# Patient Record
Sex: FEMALE | HISPANIC OR LATINO | Employment: UNEMPLOYED | URBAN - METROPOLITAN AREA
[De-identification: names, ages, dates, MRNs, and addresses within clinical notes are randomized per-mention and may not be internally consistent; named-entity substitution may affect disease eponyms.]

---

## 2017-10-16 ENCOUNTER — HOSPITAL ENCOUNTER (OUTPATIENT)
Dept: RADIOLOGY | Facility: MEDICAL CENTER | Age: 22
End: 2017-10-16
Attending: PSYCHIATRY & NEUROLOGY
Payer: MEDICAID

## 2017-10-16 VITALS
DIASTOLIC BLOOD PRESSURE: 81 MMHG | TEMPERATURE: 98.3 F | HEART RATE: 118 BPM | BODY MASS INDEX: 16.22 KG/M2 | WEIGHT: 107 LBS | OXYGEN SATURATION: 93 % | HEIGHT: 68 IN | SYSTOLIC BLOOD PRESSURE: 130 MMHG | RESPIRATION RATE: 16 BRPM

## 2017-10-16 DIAGNOSIS — G93.5 COMPRESSION OF BRAIN (HCC): ICD-10-CM

## 2017-10-16 PROCEDURE — 700111 HCHG RX REV CODE 636 W/ 250 OVERRIDE (IP)

## 2017-10-16 PROCEDURE — 72141 MRI NECK SPINE W/O DYE: CPT

## 2017-10-16 PROCEDURE — 70551 MRI BRAIN STEM W/O DYE: CPT

## 2017-10-16 PROCEDURE — 72146 MRI CHEST SPINE W/O DYE: CPT

## 2017-10-16 PROCEDURE — 700101 HCHG RX REV CODE 250

## 2017-10-16 ASSESSMENT — PAIN SCALES - WONG BAKER
WONGBAKER_NUMERICALRESPONSE: DOESN'T HURT AT ALL

## 2017-10-16 NOTE — DISCHARGE INSTRUCTIONS
MRI DISCHARGE  ADULT DISCHARGE INSTRUCTIONS    __________________________________________________________________________        YOU HAVE BEEN MEDICATED TODAY FOR YOUR SCN. PLEASE FOLLOW THE INSTRUCTIONS BELOW TO ENSURE YOUR SAFE RECOVERY. IF YOU HAVE ANY QUESTIONS OR PROBLEMS FEEL FREE TO CALL US [phone].    1) HAVE SOMEONE STAY WITH YOU TO ASSIST YOU AS NEEDED.    2) DO NOT DRIVE OR OPERATE ANY MECHANICAL DEVICES.    3) DO NOT PERFORM ANY ACTIVITY THAT REQUIRES CONCENTRATION, MAKE NO MAJOR DECISIONS OVER THE NEXT 24 HOURS.    4) BE CAREFUL CHANGING POSITIONS FROM LAYING DOWN TO SITTING OR STANDING AS YOU MAY BECOME DIZZY.     5) DO NOT DRINK ALCOHOL FOR 48 HOURS.     6) THERE ARE NO RESTRICTIONS FOR EATING YOUR NORMAL MEALS. DRINK FLUIDS.    7) YOU MAY CONTINUE YOUR USUAL MEDICATIONS FOR PAIN, TRANQUILIZERS,  MUSCLE RELAXANTS OR SEDATIVES AFTER 12 HOURS.     8) TOMORROW, YOU MAY CONTINUE YOUR NORMAL DAILY ACTIVITIES.     9) LEAVE PRESSURE DRESSING ON 10 - 15 MINUTES. IF SWELLING OR BLEEDING OCCURS WHEN REMOVED, CONTINUE PLACING DIRECT PRESSURE ON SITE FOR  ANOTHER 5 MINUTES OR UNTIL BLEEDING STOPS.     I HAVE BEEN INFORMED OF AND UNDERSTAND THE ABOVE DISCHARGE INSTRUCTIONS.       __________________________________                               PATIENT/FAMILY MEMBER SIGNATURE      __________________________________                                                RELATIONSHIP TO PATIENT    10/16/2017                        10:24 AM      __________________________________  NURSE OR WITNESS SIGNATURE

## 2017-11-13 ENCOUNTER — HOSPITAL ENCOUNTER (OUTPATIENT)
Dept: INFUSION CENTER | Facility: MEDICAL CENTER | Age: 22
End: 2017-11-13
Attending: NEUROLOGICAL SURGERY
Payer: MEDICAID

## 2017-11-13 VITALS — OXYGEN SATURATION: 100 % | HEART RATE: 100 BPM | TEMPERATURE: 98.6 F | RESPIRATION RATE: 18 BRPM

## 2017-11-13 PROCEDURE — 999999 HB NO CHARGE

## 2017-11-13 PROCEDURE — 99212 OFFICE O/P EST SF 10 MIN: CPT

## 2017-11-14 NOTE — PROGRESS NOTES
Pt to [facility] for neurosurgery visit, accompanied by mother.  Pt awake and alert, afebrile, VSS.  Visit completed with Dr. Burnett.  Will schedule follow-up in months with referrals to ENT and for swallow evaluation.  Family to get x-rays before next appointment Pt home with mother.        Level of Care/Points                 Assessment   Pts      Focused nursing assessment    Full nursing assessment   5 Vital signs - calculate every time perfomed           Special Needs   15 Pediatric/Minor Patient Management    Hear/Language/Visual special needs    Additional assistance/Altered mentation/physical limitations    Play Therapy/Diversion Activity    Isolation Management         Focused Assessment    Pain assessment    Neuro assessment    Potential abuse assessment          Coordination of Care   5 Simple Patient/Family/Staff Education for ongoing care    Complex Patient/Family/Staff Education for ongoing care   5 Staff retrieves consents, Records, test results, processes orders    Staff Telephones Physician office to clarify orders    Coordination of consults   10 Simple Discharge Coordination    Complex (extensive) Discharge Coordination         Interventions    PO meds 1-3 calculate additional 5 points for 4-6 meds and apply as many times as needed    Sublingual Meds (1-3)    Sublingual Meds (4-6)    Suppositories calculate for each time given    Topical Meds (1-3), these medications include topical lidocaine, ointment, ect    Topical Meds (4-6), these medications include topical lidocaine, ointment, ect       Eye Drops - eye drops should be calculated per time given.  Multiple drops per eye should not be counted seperately    Medication Titration calculation once    Oxygen Cannula only if placed by staff    Oxygen Mask only if placed by staff         Central Venous Access Device    Sterile dressing change    PICC arm circumference and external catheter    Central Venous Catheter Removal          Miscellaneous    Difficult Specimen collection 0-3 years old (cultures, biopsies, blood, bodily fluids, etc    Patient Transfer (multiple staff/Lift equipment    Replace Tracheostomy Tube    Tracheostomy care and dressing change    Tracheostomy suctioning    Medication Reaction    Blood Product Reaction       Point Assessment     New/Established Patient - Level 1 (15-20 points)    x New/Established Patient - Level 2 (21-45 points)     New/Established Patient - Level 3 (46-70 points)     New/Established Patient - Level 4 ( points)     New/Established Patient - Level 5 (106 or more)

## 2018-03-19 ENCOUNTER — HOSPITAL ENCOUNTER (OUTPATIENT)
Dept: RADIOLOGY | Facility: MEDICAL CENTER | Age: 23
End: 2018-03-19
Attending: NEUROLOGICAL SURGERY
Payer: MEDICAID

## 2018-03-19 ENCOUNTER — HOSPITAL ENCOUNTER (OUTPATIENT)
Dept: INFUSION CENTER | Facility: MEDICAL CENTER | Age: 23
End: 2018-03-19
Attending: NEUROLOGICAL SURGERY
Payer: MEDICAID

## 2018-03-19 VITALS — HEART RATE: 98 BPM | RESPIRATION RATE: 18 BRPM | TEMPERATURE: 98.6 F | OXYGEN SATURATION: 99 %

## 2018-03-19 DIAGNOSIS — G93.5 COMPRESSION OF BRAIN (HCC): ICD-10-CM

## 2018-03-19 PROCEDURE — 72040 X-RAY EXAM NECK SPINE 2-3 VW: CPT

## 2018-03-19 PROCEDURE — 99212 OFFICE O/P EST SF 10 MIN: CPT

## 2018-03-19 NOTE — PROGRESS NOTES
The Medication Reconciliation process has been completed by interviewing the patient's family.    Allergies have been reviewed  Antibiotic use in 30 days - none    Home Pharmacy:  [facility]

## 2018-03-19 NOTE — PROGRESS NOTES
Pt to [facility] for neurosurgery visit, accompanied by mother.  Pt awake and alert, afebrile, VSS.  Visit completed with Dr. Burnett.  Will schedule follow-up in 6 months with MRI Quick Scan - Cervical Spine.  Will also get notes from [facility]'s consultation.   Pt home with mother.        Level of Care/Points                 Assessment   Pts      Focused nursing assessment    Full nursing assessment   5 Vital signs - calculate every time perfomed           Special Needs   15 Pediatric/Minor Patient Management    Hear/Language/Visual special needs    Additional assistance/Altered mentation/physical limitations    Play Therapy/Diversion Activity    Isolation Management         Focused Assessment    Pain assessment    Neuro assessment    Potential abuse assessment          Coordination of Care   5 Simple Patient/Family/Staff Education for ongoing care    Complex Patient/Family/Staff Education for ongoing care   5 Staff retrieves consents, Records, test results, processes orders    Staff Telephones Physician office to clarify orders    Coordination of consults   10 Simple Discharge Coordination    Complex (extensive) Discharge Coordination         Interventions    PO meds 1-3 calculate additional 5 points for 4-6 meds and apply as many times as needed    Sublingual Meds (1-3)    Sublingual Meds (4-6)    Suppositories calculate for each time given    Topical Meds (1-3), these medications include topical lidocaine, ointment, ect    Topical Meds (4-6), these medications include topical lidocaine, ointment, ect       Eye Drops - eye drops should be calculated per time given.  Multiple drops per eye should not be counted seperately    Medication Titration calculation once    Oxygen Cannula only if placed by staff    Oxygen Mask only if placed by staff         Central Venous Access Device    Sterile dressing change    PICC arm circumference and external catheter    Central Venous Catheter Removal          Miscellaneous    Difficult Specimen collection 0-3 years old (cultures, biopsies, blood, bodily fluids, etc    Patient Transfer (multiple staff/Lift equipment    Replace Tracheostomy Tube    Tracheostomy care and dressing change    Tracheostomy suctioning    Medication Reaction    Blood Product Reaction       Point Assessment     New/Established Patient - Level 1 (15-20 points)    x New/Established Patient - Level 2 (21-45 points)     New/Established Patient - Level 3 (46-70 points)     New/Established Patient - Level 4 ( points)     New/Established Patient - Level 5 (106 or more)

## 2019-02-20 ENCOUNTER — HOSPITAL ENCOUNTER (OUTPATIENT)
Dept: RADIOLOGY | Facility: MEDICAL CENTER | Age: 24
End: 2019-02-20
Attending: NEUROLOGICAL SURGERY
Payer: MEDICAID

## 2019-02-20 VITALS
WEIGHT: 103 LBS | BODY MASS INDEX: 15.26 KG/M2 | SYSTOLIC BLOOD PRESSURE: 108 MMHG | TEMPERATURE: 97.9 F | HEART RATE: 78 BPM | DIASTOLIC BLOOD PRESSURE: 67 MMHG | OXYGEN SATURATION: 97 % | HEIGHT: 69 IN | RESPIRATION RATE: 18 BRPM

## 2019-02-20 DIAGNOSIS — G93.5 COMPRESSION OF BRAIN (HCC): ICD-10-CM

## 2019-02-20 PROCEDURE — 700101 HCHG RX REV CODE 250

## 2019-02-20 PROCEDURE — 72141 MRI NECK SPINE W/O DYE: CPT

## 2019-02-20 PROCEDURE — 01922 ANES N-INVAS IMG/RADJ THER: CPT

## 2019-02-20 ASSESSMENT — PAIN SCALES - WONG BAKER
WONGBAKER_NUMERICALRESPONSE: DOESN'T HURT AT ALL

## 2019-02-20 NOTE — DISCHARGE INSTRUCTIONS
MRI ADULT DISCHARGE INSTRUCTIONS    You have been medicated today for your scan. Please follow the instructions below to ensure your safe recovery. If you have any questions or problems, feel free to call us at [phone] or [phone].     1.   Have someone stay with you to assist you as needed.    2.   Do not drive or operate any mechanical devices.    3.   Do not perform any activity that requires concentration. Make no major decisions over the next 24 hours.     4.   Be careful changing positions from laying down to sitting or standing, as you may become dizzy.     5.   Do not drink alcohol for 48 hours.    6.   There are no restrictions for eating your normal meals. Drink fluids.    7.   You may continue your usual medications for pain, tranquilizers, muscle relaxants or sedatives when awake.     8.   Tomorrow, you may continue your normal daily activities.     9.   Pressure dressing on 10 - 15 minutes. If swelling or bleeding occurs when removed, continue placing direct pressure on injection site for another 5 minutes, or until bleeding stops.     I have been informed of and understand the above discharge instructions.

## 2019-02-25 ENCOUNTER — HOSPITAL ENCOUNTER (OUTPATIENT)
Dept: INFUSION CENTER | Facility: MEDICAL CENTER | Age: 24
End: 2019-02-25
Attending: NEUROLOGICAL SURGERY
Payer: MEDICAID

## 2019-02-25 VITALS — TEMPERATURE: 99.2 F | RESPIRATION RATE: 18 BRPM | OXYGEN SATURATION: 98 % | HEART RATE: 112 BPM

## 2019-02-25 PROCEDURE — 99212 OFFICE O/P EST SF 10 MIN: CPT

## 2019-02-25 RX ORDER — ACETAMINOPHEN 160 MG/5ML
LIQUID ORAL
Refills: 1 | COMMUNITY
Start: 2019-02-11

## 2019-02-25 RX ORDER — POLYETHYLENE GLYCOL 3350 17 G/17G
POWDER, FOR SOLUTION ORAL
Refills: 4 | COMMUNITY
Start: 2019-02-11

## 2019-02-26 NOTE — PROGRESS NOTES
Pt to [facility] for neurosurgery visit, accompanied by mother.  Pt awake and alert, afebrile, VSS.  Visit completed with Dr. Burnett.  Will schedule follow-up in 12 months with MRI Quick Scan.   Pt home with mother.        Level of Care/Points                 Assessment   Pts      Focused nursing assessment    Full nursing assessment   5 Vital signs - calculate every time perfomed           Special Needs   15 Pediatric/Minor Patient Management    Hear/Language/Visual special needs    Additional assistance/Altered mentation/physical limitations    Play Therapy/Diversion Activity    Isolation Management         Focused Assessment    Pain assessment    Neuro assessment    Potential abuse assessment          Coordination of Care   5 Simple Patient/Family/Staff Education for ongoing care    Complex Patient/Family/Staff Education for ongoing care   5 Staff retrieves consents, Records, test results, processes orders    Staff Telephones Physician office to clarify orders    Coordination of consults   10 Simple Discharge Coordination    Complex (extensive) Discharge Coordination         Interventions    PO meds 1-3 calculate additional 5 points for 4-6 meds and apply as many times as needed    Sublingual Meds (1-3)    Sublingual Meds (4-6)    Suppositories calculate for each time given    Topical Meds (1-3), these medications include topical lidocaine, ointment, ect    Topical Meds (4-6), these medications include topical lidocaine, ointment, ect       Eye Drops - eye drops should be calculated per time given.  Multiple drops per eye should not be counted seperately    Medication Titration calculation once    Oxygen Cannula only if placed by staff    Oxygen Mask only if placed by staff         Central Venous Access Device    Sterile dressing change    PICC arm circumference and external catheter    Central Venous Catheter Removal         Miscellaneous    Difficult Specimen collection 0-3 years old (cultures,  biopsies, blood, bodily fluids, etc    Patient Transfer (multiple staff/Lift equipment    Replace Tracheostomy Tube    Tracheostomy care and dressing change    Tracheostomy suctioning    Medication Reaction    Blood Product Reaction       Point Assessment     New/Established Patient - Level 1 (15-20 points)    x New/Established Patient - Level 2 (21-45 points)     New/Established Patient - Level 3 (46-70 points)     New/Established Patient - Level 4 ( points)     New/Established Patient - Level 5 (106 or more)

## 2021-03-07 ENCOUNTER — NURSE TRIAGE (OUTPATIENT)
Dept: HEALTH INFORMATION MANAGEMENT | Facility: OTHER | Age: 26
End: 2021-03-07

## 2022-04-22 ENCOUNTER — APPOINTMENT (OUTPATIENT)
Dept: RADIOLOGY | Facility: MEDICAL CENTER | Age: 27
End: 2022-04-22
Attending: EMERGENCY MEDICINE
Payer: MEDICAID

## 2022-04-22 ENCOUNTER — HOSPITAL ENCOUNTER (EMERGENCY)
Facility: MEDICAL CENTER | Age: 27
End: 2022-04-22
Attending: EMERGENCY MEDICINE
Payer: MEDICAID

## 2022-04-22 VITALS
OXYGEN SATURATION: 98 % | DIASTOLIC BLOOD PRESSURE: 66 MMHG | SYSTOLIC BLOOD PRESSURE: 124 MMHG | HEART RATE: 111 BPM | RESPIRATION RATE: 18 BRPM | BODY MASS INDEX: 16.29 KG/M2 | HEIGHT: 69 IN | WEIGHT: 110 LBS | TEMPERATURE: 99 F

## 2022-04-22 DIAGNOSIS — S09.90XA CLOSED HEAD INJURY, INITIAL ENCOUNTER: ICD-10-CM
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
## 2022-04-22 DIAGNOSIS — S00.83XA CONTUSION OF FOREHEAD, INITIAL ENCOUNTER: ICD-10-CM

## 2022-04-22 DIAGNOSIS — S80.212A ABRASION OF LEFT KNEE, INITIAL ENCOUNTER: ICD-10-CM

## 2022-04-22 PROCEDURE — 96372 THER/PROPH/DIAG INJ SC/IM: CPT

## 2022-04-22 PROCEDURE — 99284 EMERGENCY DEPT VISIT MOD MDM: CPT

## 2022-04-22 PROCEDURE — 73560 X-RAY EXAM OF KNEE 1 OR 2: CPT | Mod: LT

## 2022-04-22 PROCEDURE — 700111 HCHG RX REV CODE 636 W/ 250 OVERRIDE (IP): Performed by: EMERGENCY MEDICINE

## 2022-04-22 PROCEDURE — 70450 CT HEAD/BRAIN W/O DYE: CPT

## 2022-04-22 RX ORDER — KETOROLAC TROMETHAMINE 30 MG/ML
30 INJECTION, SOLUTION INTRAMUSCULAR; INTRAVENOUS ONCE
Status: COMPLETED | OUTPATIENT
Start: 2022-04-22 | End: 2022-04-22

## 2022-04-22 RX ADMIN — KETOROLAC TROMETHAMINE 30 MG: 30 INJECTION, SOLUTION INTRAMUSCULAR at 22:26

## 2022-04-23 NOTE — DISCHARGE INSTRUCTIONS
Clean the abrasion daily with warm soapy water, Neosporin and a bandage  Ice to the forehead until the swelling goes down  Tylenol or ibuprofen as needed for pain  Follow-up with your primary care provider this week for recheck and return if persistent vomiting or uncontrolled pain.

## 2022-04-23 NOTE — ED TRIAGE NOTES
"Chief Complaint   Patient presents with   • T-5000 GLF     Pt was walking at the grocery store when she tripped falling and hitting her head    • Abrasion     Left knee        Ht 1.753 m (5' 9\")   Wt 49.9 kg (110 lb)   BMI 16.24 kg/m²       "

## 2022-04-23 NOTE — ED PROVIDER NOTES
ED Provider Note     Scribed for Awilda Pascual D.O. by Seth Drummond. 4/22/2022, 10:06 PM.     Primary care provider: AMY Kennedy  Means of arrival: EMS         History obtained from: Mother  History limited by: None    CHIEF COMPLAINT  Chief Complaint   Patient presents with   • T-5000 GLF     Pt was walking at the grocery store when she tripped falling and hitting her head    • Abrasion     Left knee        HPI  Sandra Goldman is a 26 y.o. female who presents via EMS to the emergency Department for a mild left knee abrasion following a fall onset 9 PM. Per mother, the patient slipped and fell and hit her head at [facility]. She has associated symptoms of loss of consciousness and headache, but denies nausea, vomiting, or neck pain. No alleviating or exacerbating factors reported. Patient has a history of Type II Arnold-Chiari.    REVIEW OF SYSTEMS  Pertinent positives include left knee abrasion, loss of consciousness, and headache. Pertinent negatives include no nausea, vomiting, or neck pain.   See HPI for further details. All other systems are negative.    PAST MEDICAL HISTORY  Past Medical History:   Diagnosis Date   • Arnold-Chiari malformation (HCC)     Surgery at age 4   • Development delay      FAMILY HISTORY  No family history noted.    SOCIAL HISTORY  Social History     Tobacco Use   • Smoking status: Never Smoker   Substance Use Topics   • Alcohol use: No   • Drug use: No      Social History     Substance and Sexual Activity   Drug Use No       SURGICAL HISTORY  Past Surgical History:   Procedure Laterality Date   • SIGMOID COLECTOMY  1/24/2013    Performed by Juliana Hernandez M.D. at [facility]   • RECTAL BIOPSY  1/24/2013    Performed by Juliana Hernandez M.D. at [facility]   • COLONOSCOPY  1/20/2013    Performed by Dany Avila M.D. at [facility]   • COLONOSCOPY  9/27/2011    Performed by DANY AVILA at [facility]  "  • SIGMOIDOSCOPY  9/27/2011    Performed by JIMMIE YATES at [facility]   • OTHER NEUROLOGICAL SURG  age 4    Mom unaware-presumed arnold chiari repair       CURRENT MEDICATIONS  No current facility-administered medications for this encounter.    Current Outpatient Medications:   •  CHILDRENS SILAPAP 160 MG/5ML liquid, TAKE 15 MLS  1 TABLESPOON  BY MOUTH THREE TIMES A DAY EVERY 8 HOURS AS NEEDED FOR KNEE PAIN   BRING ALL MEDICATIONS TO EVERY APPOINTMENT, Disp: , Rfl: 1  •  polyethylene glycol 3350 (MIRALAX) Powder, MIX 17 GRAMS  1 CAPFUL  INTO 8 OUNCES OF WATER AND DRINK BY MOUTH AT BEDTIME AS NEEDED TO RELIEVE CONSTIPATION   BRING ALL MEDICATIONS TO EV, Disp: , Rfl: 4  •  Phenylephrine-DM-GG-APAP (COUGH/COLD/SORE THROAT CHILD PO), Take  by mouth., Disp: , Rfl:     ALLERGIES  Allergies   Allergen Reactions   • Nkda [No Known Drug Allergy]        PHYSICAL EXAM  VITAL SIGNS: Pulse (!) 127   Temp 37.1 °C (98.7 °F) (Temporal)   Resp (!) 25   Ht 1.753 m (5' 9\")   Wt 49.9 kg (110 lb)   SpO2 98%   BMI 16.24 kg/m²     Constitutional: Patient is well developed, well nourished.  Mild distress.  HENT: Normocephalic, 4x4 cm rounded area of soft tissue swelling contusion on right forehead, 3x2 cm area of soft tissue on mid forehead  Neck: No neck tenderness, Full range of motion.  Cardiovascular: Normal heart rate and Regular rhythm. No murmur  Thorax & Lungs: Clear and equal breath sounds with good excursion. No respiratory distress  Abdomen: Bowel sounds normal in all four quadrants. Soft,nontender  Skin: Warm, Dry,  Contusion on distal lateral aspect of right forearm, 2 cm rounded abrasion over the left patella.   Extremities: Peripheral pulses 4/4 No edema, Contusion on distal lateral aspect of right forearm, Neurovascularly intact, No effusion, Normal range of motion, 2 cm rounded abrasion over the left patella.   Musculoskeletal: Normal range of motion in all major joints.   Neurologic: Alert , Normal " motor function, Normal sensory function.  Psychiatric: Affect odd secondary to patient's mental impairment.    DIAGNOSTICS/PROCEDURES    RADIOLOGY/PROCEDURES  DX-KNEE 2- LEFT   Final Result         1.  No acute traumatic bony injury.      CT-HEAD W/O   Final Result         1.  No acute intracranial abnormality.   2.  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     COURSE & MEDICAL DECISION MAKING  Pertinent Labs & Imaging studies reviewed. (See chart for details)    10:06 PM - Patient seen and evaluated at bedside. Ordered for CT-Head w/o and DX-Knee Left to evaluate. Patient will be treated with Toradol 30 mg injection for her symptoms.    11:10 PM - Patient was reevaluated with mother at bedside. I informed her mother her radiology results show no abnormalities. Discussed plans and precautions for discharge. Patient's mother understands and verbalizes agreement to the plan of discharge.   Patient was improved after the medications and she will be discharged in stable and improved condition.  They are to follow-up with her primary care doctor within the next 2 to 3 days for recheck and return if problems.    The patient will return for new or worsening symptoms and is stable at the time of discharge.    DISPOSITION:  Patient will be discharged home in stable condition.    FOLLOW UP:  Jing [place], P.A.  [address] [facility] [place] [address]  [phone]    Schedule an appointment as soon as possible for a visit in 3 days  As needed, If symptoms worsen    FINAL IMPRESSION  1. Fall from ground level    2. Abrasion of left knee, initial encounter    3. Closed head injury, initial encounter    4. Contusion of forehead, initial encounter      Seth HANCOCK (Nadya), am scribing for, and in the presence of, Awilda Pascual D.O..    Electronically signed by: Seth Drummond (Loriibe), 4/22/2022    Awilda HANCOCK  GRIS Pascual personally performed the services described in this documentation, as scribed by Seth Drummond in my presence, and it is both accurate and complete.    The note accurately reflects work and decisions made by me.  Awilda Pascual D.O.  4/23/2022  3:06 AM

## 2022-04-23 NOTE — ED NOTES
ERP at bedside. Pt agrees with plan of care discussed by ERP. AIDET acknowledged with patient. Stephanie in low position, side rail up for pt safety. Call light within reach. Will continue to monitor.

## 2022-04-23 NOTE — ED NOTES
Patient discharged. All discharged instructions reviewed with mom. Mom verbalizes understanding.Mom feels safe taking patient home. Advised to come back for new or worsening symptoms.

## 2023-08-08 NOTE — ED NOTES
LVM for patient to call and schedule an appointment,.    abx ointment and bandage applied to left knee.